# Patient Record
(demographics unavailable — no encounter records)

---

## 2024-12-11 NOTE — HISTORY OF PRESENT ILLNESS
[FreeTextEntry1] : Last seen by me 8/6 when we starting phentermine/topiramate; numerous subsequent visits with RD.  Spoke with mom and patient 1 month ago about nausea, headaches, anxiety, thought to be due to phentermine. Improved when d/c'ed phentermine 15mg and still ok on phentermine 8mg (lomaira). Has continued on topiramte 75mg.  Diet and Eating Patterns - Increased appetite with lomaira compared to previous medication. - Eating patterns include processed foods and limited fruits and vegetables. - Foods mentioned: sandwich with cream cheese and chocolate spread, half an apple, half a grilled cheese, cucumber, rice with turkey hot dogs. - Physical activity: expressed interest in joining a gym but parents have not made any effort to help her sign up; mom thinks she will pay for it and Bon won't use it  Mood and Behavior Changes - Mom blames medication for an increase in irritability and aggression, Bon agrees irritability has been worse but thinks it's related to other life stressors, not meds, and that parents are eager to blame everything on meds; patient sees value of meds and parents see meds as a way for her to "not try" - open to restarting therapy even though she was not in previous discussions, open to seeing psychiatry - No current thoughts of self-harm or suicide, but has had such thoughts in the past. - Increased sensitivity to sounds noted, described as triggering.  Headaches - Slight headaches currently experienced but better than prior - Headaches occur primarily when not eating. - History of migraines, sometimes related to eating patterns.  Other - Reports hair falling out, possibly linked to topiramate. - shoulder pain improved, bon thinks b/c of weight loss

## 2024-12-11 NOTE — ASSESSMENT
[Case reviewed with RD. Please see RD note for additional details such as lifestyle habits] : Case reviewed with RD. Please see RD note for additional details such as lifestyle habits and specific behavioral goals [FreeTextEntry1] : 17yo initially w/ class 2 obesity complicated/exacerbated by chronic pain, depression/anxiety, now with BMI in overweight range on phentermine topiramate but significant side effects and recent worsening in mood. Still significant tension and dysfunctional/counterproductive interactions between parents and patient around weight. Mood changes and hair loss potentially related to current medication regimen  Plan - Reduce topiramate dosage to 50 mg daily. Monitor for changes in mood and irritability. Consider further reduction to 25 mg after a few weeks, based on patient response and reassessment. - Connect with the behavioral health care manager at [facility] to explore short-term therapy options and potential psychiatric consultation. - Encourage Nicole to engage in regular physical activity, such as joining a gym, to support weight management and overall health. - Emphasize the importance of making healthier dietary choices to maintain weight loss and improve overall well-being. - Schedule a follow-up appointment with Dorys TESFAYE in one month to evaluate the effects of the reduced topiramate dosage and discuss further adjustments if necessary.

## 2024-12-11 NOTE — END OF VISIT
[Time Spent: ___ minutes] : I have spent [unfilled] minutes of time on the encounter which excludes teaching and separately reported services. [] : 2 [FeedbackText] : poorly organized

## 2024-12-11 NOTE — ASSESSMENT
[Case reviewed with RD. Please see RD note for additional details such as lifestyle habits] : Case reviewed with RD. Please see RD note for additional details such as lifestyle habits and specific behavioral goals [FreeTextEntry1] : 17yo initially w/ class 2 obesity complicated/exacerbated by chronic pain, depression/anxiety, now with BMI in overweight range on phentermine topiramate but significant side effects and recent worsening in mood. Still significant tension and dysfunctional/counterproductive interactions between parents and patient around weight. Mood changes and hair loss potentially related to current medication regimen  Plan - Reduce topiramate dosage to 50 mg daily. Monitor for changes in mood and irritability. Consider further reduction to 25 mg after a few weeks, based on patient response and reassessment. - Connect with the behavioral health care manager at [facility] to explore short-term therapy options and potential psychiatric consultation. - Encourage Nicole to engage in regular physical activity, such as joining a gym, to support weight management and overall health. - Emphasize the importance of making healthier dietary choices to maintain weight loss and improve overall well-being. - Schedule a follow-up appointment with Dorys TESFAYE in one month to evaluate the effects of the reduced topiramate dosage and discuss further adjustments if necessary.

## 2025-02-05 NOTE — PHYSICAL EXAM
[Wheezing] : wheezing [NL] : soft, nontender, nondistended, normal bowel sounds, no hepatosplenomegaly [de-identified] : left shoulder with tenderness

## 2025-02-05 NOTE — HISTORY OF PRESENT ILLNESS
[EENT/Resp Symptoms] : EENT/RESPIRATORY SYMPTOMS [Runny nose] : runny nose [Wheezing] : wheezing [___ Day(s)] : [unfilled] day(s) [Sick Contacts: ___] : no sick contacts [Dry cough] : dry cough [Fever] : no fever [Vomiting] : no vomiting [Diarrhea] : no diarrhea [FreeTextEntry6] : long standing pain in left shoulder
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
## 2025-03-24 NOTE — PHYSICAL EXAM
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
## 2025-03-24 NOTE — PHYSICAL EXAM

## 2025-03-24 NOTE — DISCUSSION/SUMMARY
[Anticipatory Guidance Given] : Anticipatory guidance addressed as per the history of present illness section [Physical Growth and Development] : physical growth and development [Social and Academic Competence] : social and academic competence [Emotional Well-Being] : emotional well-being [Risk Reduction] : risk reduction [Violence and Injury Prevention] : violence and injury prevention [] : The components of the vaccine(s) to be administered today are listed in the plan of care. The disease(s) for which the vaccine(s) are intended to prevent and the risks have been discussed with the caretaker.  The risks are also included in the appropriate vaccination information statements which have been provided to the patient's caregiver.  The caregiver has given consent to vaccinate. [FreeTextEntry1] : Nicole is a 16yo F with hx anxiety, depression, asthma, chronic back and shoulder pain, obesity here for initial PE/establish care.   Plan: - Vaccine today: MenQuadfi#2 - Lab: CBC, CMP, lipid, A1c - Refer to Orthopedics physiatrist for chronic thoracic pain and left should pain - List of MH agencies provided to start psychotherapy - Discussed in length healthy stress management skills, relaxation, mediation, physical activity, journaling, sketching  - Ibuprofen 400mg pO Q8hrs at onset of dysmenorrhea  NSAIDs and Dysmenorrhea Nonsteroidal anti-inflammatory drugs (NSAIDs) blocking prostaglandin production. Dysmenorrhea appears to be caused by excess production of endometrial prostaglandin can cause dysrhythmic uterine contractions, hypercontractility, and increased uterine muscle tone leading to uterine ischemia. They also can account for nausea, vomiting, and diarrhea via stimulation of the gastrointestinal tract. - Start seasonal allergy regimen: cetirizine PO QD, azelastine ophthalmic drop BID prn for itching - Seasonal allergy prevention: limit outdoor exposure during high allergen count, shower before bedtime, use air purifier, keep windows closed, vacuum often - FU annually for PE, sooner with concerns

## 2025-03-24 NOTE — PHYSICAL EXAM
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
## 2025-03-24 NOTE — DISCUSSION/SUMMARY
[Anticipatory Guidance Given] : Anticipatory guidance addressed as per the history of present illness section [Physical Growth and Development] : physical growth and development [Social and Academic Competence] : social and academic competence [Emotional Well-Being] : emotional well-being [Risk Reduction] : risk reduction [Violence and Injury Prevention] : violence and injury prevention [] : The components of the vaccine(s) to be administered today are listed in the plan of care. The disease(s) for which the vaccine(s) are intended to prevent and the risks have been discussed with the caretaker.  The risks are also included in the appropriate vaccination information statements which have been provided to the patient's caregiver.  The caregiver has given consent to vaccinate. [FreeTextEntry1] : Nicole is a 18yo F with hx anxiety, depression, asthma, chronic back and shoulder pain, obesity here for initial PE/establish care.   Plan: - Vaccine today: MenQuadfi#2 - Lab: CBC, CMP, lipid, A1c - Refer to Orthopedics physiatrist for chronic thoracic pain and left should pain - List of MH agencies provided to start psychotherapy - Discussed in length healthy stress management skills, relaxation, mediation, physical activity, journaling, sketching  - Ibuprofen 400mg pO Q8hrs at onset of dysmenorrhea  NSAIDs and Dysmenorrhea Nonsteroidal anti-inflammatory drugs (NSAIDs) blocking prostaglandin production. Dysmenorrhea appears to be caused by excess production of endometrial prostaglandin can cause dysrhythmic uterine contractions, hypercontractility, and increased uterine muscle tone leading to uterine ischemia. They also can account for nausea, vomiting, and diarrhea via stimulation of the gastrointestinal tract. - Start seasonal allergy regimen: cetirizine PO QD, azelastine ophthalmic drop BID prn for itching - Seasonal allergy prevention: limit outdoor exposure during high allergen count, shower before bedtime, use air purifier, keep windows closed, vacuum often - FU annually for PE, sooner with concerns
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
## 2025-05-13 NOTE — DATA REVIEWED
[de-identified] : Scoliosis XRs taken in office on 5/12/25 were viewed and independently interpreted: mild scoliosis, no progression as compared to previous scoliosis films  L shoulder XRs taken in office on 5/12/25 were viewed and independently interpreted: The patient is skeletally mature. No fracture, or dislocation noted. No bony abnormalities noted. No soft tissue findings.   MRI of the left shoulder performed 1/30/24: Mild rotator cuff tendinosis without tear. Labrum is intact.  MRI of the thoracic spine 1/4/24: Mild s-shaped scoliosis.   2 views Left shoulder radiographs were obtained 12/15/23. No obvious fracture. Bones are in normal alignment. Joint spaces are preserved  Xray of the entire spine 12/15/23: double curve scoliosis with right thoracic and left thoracolumbar of  12 and 10' respectively. Risser 5

## 2025-05-13 NOTE — REASON FOR VISIT
[Follow Up] : a follow up visit [Patient] : patient [Mother] : mother [FreeTextEntry1] : L shoulder pain

## 2025-05-13 NOTE — PHYSICAL EXAM
[FreeTextEntry1] : L shoulder: Skin intact Mild TTP over ACJ, acromion, and posterior shoulder/scapular spine + increase muscle tension over trapezius musculature + pain with PROM of shoulder Reports discomfort with forward flexion past 120 degrees 5/5 strength of supraspinatus/infraspinatus/subscapularis/teres minor Negative drop sign test, negative shannan's test Negative external rotation lag sign, negative internal rotation lag sign Lift off test intact Belly press intact, bear hug intact + pain with hayes's test at posterior shoulder + speed's test + sanchez impingement test  Motor:  5/5 BUE: deltoids, biceps, triceps, wrist extension, finger flexion, IO  Sensory: 2/2 BUE: C5-T1 Negative hoffmans
none

## 2025-05-13 NOTE — REVIEW OF SYSTEMS
[Joint Pains] : arthralgias [Change in Activity] : no change in activity [Fever Above 102] : no fever [Rash] : no rash [Itching] : no itching [Eye Pain] : no eye pain [Redness] : no redness [Sore Throat] : no sore throat [Earache] : no earache [Wheezing] : no wheezing [Change in Appetite] : no change in appetite [Vomiting] : no vomiting [Limping] : no limping [Joint Swelling] : no joint swelling

## 2025-05-13 NOTE — ASSESSMENT
[FreeTextEntry1] : 17 year old female with left shoulder pain, L shoulder MRI from 12/2023 confirm L rotator cuff tendinosis and mild stable scoliosis.  Today's visit included obtaining the history from the child and parent, due to the child's age, the child could not be considered a reliable historian, requiring the parent to act as an independent historian. The condition, natural history, and prognosis were explained to the patient and family. The clinical findings and images were reviewed with the family.   Scoliosis XRs taken in office on 5/12/25 were viewed and independently interpreted: mild scoliosis, no progression as compared to previous scoliosis films  L shoulder XRs taken in office on 5/12/25 were viewed and independently interpreted: The patient is skeletally mature. No fracture, or dislocation noted. No bony abnormalities noted. No soft tissue findings.   MRI of the left shoulder performed 1/30/24: Mild rotator cuff tendinosis without tear. Labrum is intact.  MRI of the thoracic spine 1/4/24: Mild s-shaped scoliosis.   Patient represents after exacerbation in her L chronic shoulder pain that is debilitating in nature. Patient is unable to go to school because of the pain. She previously underwent 6 months of PT in 2024 but reports no improvement. She recently underwent PT evaluation in 2025, but reports exacerbation of her pain and no longer wants to go to PT. Mom is requesting new MRI of L shoulder and cervical spine despite no new injury given significant pain and occasional subjective numbness in axillary nerve distribution when pain is exacerbated. I also discussed a trial of tylenol/motrin ATC at appropriate weight-based dose to see if there is any pain relief and a trial of alternative modes of pain management.  The office will reach out if insurance authorization is obtained for the MRIs.  Of note, Mom is requesting letter regarding severity of shoulder pain and reason patient is unable to go to school. Mom will send forms to the office.  All questions were answered, the family expresses understanding and agrees with the plan of care.